# Patient Record
Sex: FEMALE | Race: WHITE | NOT HISPANIC OR LATINO | Employment: OTHER | URBAN - METROPOLITAN AREA
[De-identification: names, ages, dates, MRNs, and addresses within clinical notes are randomized per-mention and may not be internally consistent; named-entity substitution may affect disease eponyms.]

---

## 2023-11-25 ENCOUNTER — HOSPITAL ENCOUNTER (EMERGENCY)
Facility: HOSPITAL | Age: 53
Discharge: HOME | End: 2023-11-25
Attending: STUDENT IN AN ORGANIZED HEALTH CARE EDUCATION/TRAINING PROGRAM
Payer: MEDICAID

## 2023-11-25 VITALS
HEIGHT: 69 IN | HEART RATE: 106 BPM | RESPIRATION RATE: 18 BRPM | SYSTOLIC BLOOD PRESSURE: 212 MMHG | TEMPERATURE: 97.3 F | DIASTOLIC BLOOD PRESSURE: 102 MMHG | BODY MASS INDEX: 43.4 KG/M2 | OXYGEN SATURATION: 98 % | WEIGHT: 293 LBS

## 2023-11-25 DIAGNOSIS — U07.1 COVID: Primary | ICD-10-CM

## 2023-11-25 LAB
ALBUMIN SERPL BCP-MCNC: 4.2 G/DL (ref 3.4–5)
ALP SERPL-CCNC: 88 U/L (ref 33–110)
ALT SERPL W P-5'-P-CCNC: 17 U/L (ref 7–45)
ANION GAP SERPL CALC-SCNC: 12 MMOL/L (ref 10–20)
APPEARANCE UR: CLEAR
AST SERPL W P-5'-P-CCNC: 14 U/L (ref 9–39)
BASOPHILS # BLD AUTO: 0.03 X10*3/UL (ref 0–0.1)
BASOPHILS NFR BLD AUTO: 0.3 %
BILIRUB DIRECT SERPL-MCNC: 0 MG/DL (ref 0–0.3)
BILIRUB SERPL-MCNC: 0.2 MG/DL (ref 0–1.2)
BILIRUB UR STRIP.AUTO-MCNC: NEGATIVE MG/DL
BUN SERPL-MCNC: 14 MG/DL (ref 6–23)
CALCIUM SERPL-MCNC: 8.8 MG/DL (ref 8.6–10.3)
CARDIAC TROPONIN I PNL SERPL HS: 11 NG/L (ref 0–13)
CHLORIDE SERPL-SCNC: 99 MMOL/L (ref 98–107)
CO2 SERPL-SCNC: 25 MMOL/L (ref 21–32)
COLOR UR: ABNORMAL
CREAT SERPL-MCNC: 0.52 MG/DL (ref 0.5–1.05)
EOSINOPHIL # BLD AUTO: 0.02 X10*3/UL (ref 0–0.7)
EOSINOPHIL NFR BLD AUTO: 0.2 %
ERYTHROCYTE [DISTWIDTH] IN BLOOD BY AUTOMATED COUNT: 15 % (ref 11.5–14.5)
FLUAV RNA RESP QL NAA+PROBE: NOT DETECTED
FLUBV RNA RESP QL NAA+PROBE: NOT DETECTED
GFR SERPL CREATININE-BSD FRML MDRD: >90 ML/MIN/1.73M*2
GLUCOSE SERPL-MCNC: 251 MG/DL (ref 74–99)
GLUCOSE UR STRIP.AUTO-MCNC: ABNORMAL MG/DL
HCT VFR BLD AUTO: 35.2 % (ref 36–46)
HGB BLD-MCNC: 10.8 G/DL (ref 12–16)
HOLD SPECIMEN: NORMAL
IMM GRANULOCYTES # BLD AUTO: 0.02 X10*3/UL (ref 0–0.7)
IMM GRANULOCYTES NFR BLD AUTO: 0.2 % (ref 0–0.9)
KETONES UR STRIP.AUTO-MCNC: NEGATIVE MG/DL
LEUKOCYTE ESTERASE UR QL STRIP.AUTO: NEGATIVE
LIPASE SERPL-CCNC: 20 U/L (ref 9–82)
LYMPHOCYTES # BLD AUTO: 1.42 X10*3/UL (ref 1.2–4.8)
LYMPHOCYTES NFR BLD AUTO: 15.1 %
MCH RBC QN AUTO: 24.4 PG (ref 26–34)
MCHC RBC AUTO-ENTMCNC: 30.7 G/DL (ref 32–36)
MCV RBC AUTO: 80 FL (ref 80–100)
MONOCYTES # BLD AUTO: 0.53 X10*3/UL (ref 0.1–1)
MONOCYTES NFR BLD AUTO: 5.6 %
MUCOUS THREADS #/AREA URNS AUTO: NORMAL /LPF
NEUTROPHILS # BLD AUTO: 7.4 X10*3/UL (ref 1.2–7.7)
NEUTROPHILS NFR BLD AUTO: 78.6 %
NITRITE UR QL STRIP.AUTO: NEGATIVE
NRBC BLD-RTO: 0 /100 WBCS (ref 0–0)
PH UR STRIP.AUTO: 6 [PH]
PLATELET # BLD AUTO: 244 X10*3/UL (ref 150–450)
POTASSIUM SERPL-SCNC: 4 MMOL/L (ref 3.5–5.3)
PROT SERPL-MCNC: 7.3 G/DL (ref 6.4–8.2)
PROT UR STRIP.AUTO-MCNC: ABNORMAL MG/DL
RBC # BLD AUTO: 4.42 X10*6/UL (ref 4–5.2)
RBC # UR STRIP.AUTO: NEGATIVE /UL
RBC #/AREA URNS AUTO: NORMAL /HPF
SARS-COV-2 RNA RESP QL NAA+PROBE: DETECTED
SODIUM SERPL-SCNC: 132 MMOL/L (ref 136–145)
SP GR UR STRIP.AUTO: 1.01
SQUAMOUS #/AREA URNS AUTO: NORMAL /HPF
UROBILINOGEN UR STRIP.AUTO-MCNC: <2 MG/DL
WBC # BLD AUTO: 9.4 X10*3/UL (ref 4.4–11.3)
WBC #/AREA URNS AUTO: NORMAL /HPF

## 2023-11-25 PROCEDURE — 84484 ASSAY OF TROPONIN QUANT: CPT | Performed by: STUDENT IN AN ORGANIZED HEALTH CARE EDUCATION/TRAINING PROGRAM

## 2023-11-25 PROCEDURE — 99284 EMERGENCY DEPT VISIT MOD MDM: CPT | Performed by: STUDENT IN AN ORGANIZED HEALTH CARE EDUCATION/TRAINING PROGRAM

## 2023-11-25 PROCEDURE — 82248 BILIRUBIN DIRECT: CPT | Performed by: STUDENT IN AN ORGANIZED HEALTH CARE EDUCATION/TRAINING PROGRAM

## 2023-11-25 PROCEDURE — 81001 URINALYSIS AUTO W/SCOPE: CPT | Performed by: STUDENT IN AN ORGANIZED HEALTH CARE EDUCATION/TRAINING PROGRAM

## 2023-11-25 PROCEDURE — 36415 COLL VENOUS BLD VENIPUNCTURE: CPT | Performed by: STUDENT IN AN ORGANIZED HEALTH CARE EDUCATION/TRAINING PROGRAM

## 2023-11-25 PROCEDURE — 80053 COMPREHEN METABOLIC PANEL: CPT | Performed by: STUDENT IN AN ORGANIZED HEALTH CARE EDUCATION/TRAINING PROGRAM

## 2023-11-25 PROCEDURE — 87636 SARSCOV2 & INF A&B AMP PRB: CPT | Performed by: STUDENT IN AN ORGANIZED HEALTH CARE EDUCATION/TRAINING PROGRAM

## 2023-11-25 PROCEDURE — 83690 ASSAY OF LIPASE: CPT | Performed by: STUDENT IN AN ORGANIZED HEALTH CARE EDUCATION/TRAINING PROGRAM

## 2023-11-25 PROCEDURE — 85025 COMPLETE CBC W/AUTO DIFF WBC: CPT | Performed by: STUDENT IN AN ORGANIZED HEALTH CARE EDUCATION/TRAINING PROGRAM

## 2023-11-25 PROCEDURE — 99283 EMERGENCY DEPT VISIT LOW MDM: CPT | Mod: 25

## 2023-11-25 ASSESSMENT — COLUMBIA-SUICIDE SEVERITY RATING SCALE - C-SSRS
6. HAVE YOU EVER DONE ANYTHING, STARTED TO DO ANYTHING, OR PREPARED TO DO ANYTHING TO END YOUR LIFE?: NO
1. IN THE PAST MONTH, HAVE YOU WISHED YOU WERE DEAD OR WISHED YOU COULD GO TO SLEEP AND NOT WAKE UP?: NO
2. HAVE YOU ACTUALLY HAD ANY THOUGHTS OF KILLING YOURSELF?: NO

## 2023-11-25 ASSESSMENT — PAIN - FUNCTIONAL ASSESSMENT: PAIN_FUNCTIONAL_ASSESSMENT: 0-10

## 2023-11-25 ASSESSMENT — PAIN SCALES - GENERAL: PAINLEVEL_OUTOF10: 0 - NO PAIN

## 2023-11-26 NOTE — DISCHARGE INSTRUCTIONS
You were diagnosed with COVID-19.  We did discuss the medication Paxlovid (information provided for the generic name nirmatrelvir/ritonavir), which you did not want to take.  Please call your primary care physician.  Please return to the emergency department if your symptoms worsen, you note increasing shortness of breath, or if you have any other concerns.  In addition to the information provided and what we talked about in the emergency department, please feel free to visit the CDC website regarding COVID-19 quarantine precautions.

## 2023-11-26 NOTE — ED PROVIDER NOTES
HPI   Chief Complaint   Patient presents with    Hypertension     52 y/o female complains of rapid heart rate and presents with hypertension. Patient denies chest  pain or sob. Complains of shivering and  nausea.       History of hypertension on lisinopril as well as diabetes on glyburide and metformin presents with acute onset of generally feeling unwell.  Patient was reportedly in her usual state of health when she began to feel nauseous, dizzy, and generally unwell.  Unclear if this was exertional or not.  Feels more comfortable resting in the emergency department bed.  No falls or head trauma.  Denies headache, vision changes, chest pain, shortness of breath, abdominal pain, urinary symptoms, diarrhea, and blood in stool.  No known sick contacts.  Has never had anything like this before.  Still able to ambulate without assistance.      History provided by:  Patient and spouse   used: No (Patient's primary language is not English.  She was offered an , but declined.  She prefers her daughter at bedside interpret.  We discussed the risk of this.)                        [place] Coma Scale Score: 15                  Patient History   No past medical history on file.  No past surgical history on file.  No family history on file.  Social History     Tobacco Use    Smoking status: Not on file    Smokeless tobacco: Not on file   Substance Use Topics    Alcohol use: Not on file    Drug use: Not on file       Physical Exam   ED Triage Vitals [11/25/23 1934]   Temp Heart Rate Resp BP   36.3 °C (97.3 °F) 106 18 (!) 212/102      SpO2 Temp Source Heart Rate Source Patient Position   98 % Temporal Monitor Sitting      BP Location FiO2 (%)     Right arm --       Physical Exam  Vitals and nursing note reviewed.   HENT:      Head: Atraumatic.      Mouth/Throat:      Mouth: Mucous membranes are moist.   Eyes:      Extraocular Movements: Extraocular movements intact.      Conjunctiva/sclera:  Conjunctivae normal.      Pupils: Pupils are equal, round, and reactive to light.   Cardiovascular:      Rate and Rhythm: Regular rhythm. Tachycardia present.      Pulses: Normal pulses.      Comments: There is no lower extremity edema or erythema.  Pulmonary:      Effort: Pulmonary effort is normal.      Breath sounds: Normal breath sounds.   Abdominal:      Palpations: Abdomen is soft.      Tenderness: There is no abdominal tenderness.   Genitourinary:     Comments: No suprapubic tenderness.  No CVA tenderness.  Musculoskeletal:         General: No deformity. Normal range of motion.      Cervical back: Normal range of motion.   Skin:     General: Skin is warm and dry.   Neurological:      Mental Status: She is alert.      Comments: Mentating appropriately.  Cranial nerves II through XII grossly intact.  No obvious nystagmus with primary gaze, leftward gaze, or rightward gaze.  No ataxia with finger-nose-finger or heel-to-shin.  No drift with any of the extremities.  No dysarthria.  No diplopia.  No dysphagia.  No aphasia.  No extinction or inattention.  Sensation is intact to light touch in all extremities.         ED Course & MDM   ED Course as of 11/25/23 2130   Sat Nov 25, 2023 2018 ECG 12 lead  My ECG interpretation: Normal sinus rhythm heart rate 108, no ST segment elevation, QTc 463 [AB]   2112 Coronavirus 2019, PCR(!): Detected  Discussed this with the patient and how I think this is likely the cause of her nausea and generally feeling unwell.  As she has no signs of trauma and no neurologic deficits, we discussed the need for CT imaging in light of this positive test; she would not like to proceed with CT imaging, which I think is very reasonable. [AB]   2129 We had a shared decision-making discussion regarding Paxlovid.  Patient would not like to pursue this therapy. [AB]   2129 SpO2: 98 % [AB]      ED Course User Index  [AB] Zack Cottrell MD         Diagnoses as of 11/25/23 2130   COVID        Medical Decision Making  History of hypertension on lisinopril as well as diabetes on glyburide and metformin presents with acute onset of generally feeling unwell with nausea and dizziness.  Dizziness has improved since she has been at rest in the emergency department.    No red flag signs/symptoms of acute vestibular syndrome or posterior CVA on exam.  No cortical findings on neurologic exam.    Found to have COVID-19.  Not hypoxic.  No indication for Decadron.  No indication for admission at this time.  Discussed Paxlovid, but patient declined.    Amount and/or Complexity of Data Reviewed  Labs: ordered.  ECG/medicine tests: ordered and independent interpretation performed. Decision-making details documented in ED Course.        Procedure  Procedures     Zack Cottrell MD  11/25/23 3149

## 2023-12-19 ENCOUNTER — HOSPITAL ENCOUNTER (OUTPATIENT)
Dept: CARDIOLOGY | Facility: HOSPITAL | Age: 53
Discharge: HOME | End: 2023-12-19
Payer: MEDICAID

## 2023-12-19 PROCEDURE — 93005 ELECTROCARDIOGRAM TRACING: CPT

## 2023-12-30 LAB
ATRIAL RATE: 108 BPM
P AXIS: 49 DEGREES
P OFFSET: 196 MS
P ONSET: 138 MS
PR INTERVAL: 150 MS
Q ONSET: 213 MS
QRS COUNT: 17 BEATS
QRS DURATION: 108 MS
QT INTERVAL: 346 MS
QTC CALCULATION(BAZETT): 463 MS
QTC FREDERICIA: 420 MS
R AXIS: 16 DEGREES
T AXIS: 43 DEGREES
T OFFSET: 386 MS
VENTRICULAR RATE: 108 BPM

## 2025-05-04 ENCOUNTER — OFFICE VISIT (OUTPATIENT)
Dept: URGENT CARE | Age: 55
End: 2025-05-04
Payer: MEDICAID

## 2025-05-04 VITALS
OXYGEN SATURATION: 98 % | HEART RATE: 86 BPM | DIASTOLIC BLOOD PRESSURE: 87 MMHG | SYSTOLIC BLOOD PRESSURE: 144 MMHG | TEMPERATURE: 97.5 F | RESPIRATION RATE: 20 BRPM

## 2025-05-04 DIAGNOSIS — H60.311 ACUTE DIFFUSE OTITIS EXTERNA OF RIGHT EAR: ICD-10-CM

## 2025-05-04 DIAGNOSIS — H65.91 OTITIS MEDIA WITH EFFUSION, RIGHT: Primary | ICD-10-CM

## 2025-05-04 DIAGNOSIS — I10 ESSENTIAL HYPERTENSION: ICD-10-CM

## 2025-05-04 PROBLEM — E11.9 CONTROLLED TYPE 2 DIABETES MELLITUS WITHOUT COMPLICATION, WITHOUT LONG-TERM CURRENT USE OF INSULIN: Status: ACTIVE | Noted: 2019-09-11

## 2025-05-04 RX ORDER — NEOMYCIN SULFATE, POLYMYXIN B SULFATE, HYDROCORTISONE 3.5; 10000; 1 MG/ML; [USP'U]/ML; MG/ML
3 SOLUTION/ DROPS AURICULAR (OTIC) 4 TIMES DAILY
Qty: 10 ML | Refills: 0 | Status: SHIPPED | OUTPATIENT
Start: 2025-05-04 | End: 2025-05-14

## 2025-05-04 RX ORDER — FLUTICASONE PROPIONATE 50 MCG
1 SPRAY, SUSPENSION (ML) NASAL DAILY
Qty: 16 G | Refills: 0 | Status: SHIPPED | OUTPATIENT
Start: 2025-05-04 | End: 2026-05-04

## 2025-05-04 RX ORDER — METFORMIN HYDROCHLORIDE 500 MG/1
1000 TABLET, EXTENDED RELEASE ORAL
COMMUNITY

## 2025-05-04 RX ORDER — GLYBURIDE 5 MG/1
TABLET ORAL
COMMUNITY

## 2025-05-04 RX ORDER — AMOXICILLIN AND CLAVULANATE POTASSIUM 875; 125 MG/1; MG/1
875 TABLET, FILM COATED ORAL 2 TIMES DAILY
Qty: 20 TABLET | Refills: 0 | Status: SHIPPED | OUTPATIENT
Start: 2025-05-04 | End: 2025-05-14

## 2025-05-04 RX ORDER — LISINOPRIL 20 MG/1
20 TABLET ORAL DAILY
COMMUNITY

## 2025-05-04 ASSESSMENT — ENCOUNTER SYMPTOMS
DIZZINESS: 0
CHILLS: 0
FEVER: 0
SORE THROAT: 0
RHINORRHEA: 0
COUGH: 0
DIARRHEA: 0
VOMITING: 0
HEADACHES: 0
NECK PAIN: 0
ABDOMINAL PAIN: 0
FATIGUE: 0

## 2025-05-04 NOTE — PATIENT INSTRUCTIONS
Your child has an ear infection (otitis media). Here’s how to care for them at home:    Give pain relievers like acetaminophen (Tylenol) or ibuprofen (Motrin) as needed for pain or fever.  Encourage fluids to keep them hydrated.  Use warm compresses on the ear for comfort.  Give antibiotics as prescribed (if provided) and complete the full course.  Avoid water in the ear if there is drainage.  Watch for:  Worsening pain or fever over 102°F.  Swelling, redness, or drainage from the ear.  Trouble hearing or persistent symptoms after treatment.  Follow up with the pediatrician if symptoms do not improve in a few days or worsen.

## 2025-05-04 NOTE — PROGRESS NOTES
Subjective   Patient ID: Nuzhat Maki is a 54 y.o. female. They present today with a chief complaint of Earache (X 1 day).    History of Present Illness  54 year old female presents with complaint of right ear pain. Symptoms started after she got her hear washed at the salon 2 days ago and have progressively worsened. Has taken tylenol with mild relief. Reports hx of ear infections and has had to see ENT in the past.       Earache   There is pain in the right ear. This is a new problem. The current episode started in the past 7 days. The problem occurs constantly. The problem has been gradually worsening. There has been no fever. The pain is at a severity of 7/10. The pain is moderate. Pertinent negatives include no abdominal pain, coughing, diarrhea, ear discharge, headaches, hearing loss, neck pain, rash, rhinorrhea, sore throat or vomiting. She has tried acetaminophen for the symptoms. The treatment provided no relief. Her past medical history is significant for a chronic ear infection. There is no history of hearing loss or a tympanostomy tube.       Past Medical History  Allergies as of 05/04/2025    (No Known Allergies)       Prescriptions Prior to Admission[1]     Medical History[2]    Surgical History[3]     reports that she has never smoked. She has never used smokeless tobacco.    Review of Systems  Review of Systems   Constitutional:  Negative for chills, fatigue and fever.   HENT:  Positive for ear pain. Negative for ear discharge, hearing loss, rhinorrhea and sore throat.    Respiratory:  Negative for cough.    Cardiovascular:  Negative for chest pain.   Gastrointestinal:  Negative for abdominal pain, diarrhea and vomiting.   Musculoskeletal:  Negative for neck pain.   Skin:  Negative for rash.   Neurological:  Negative for dizziness and headaches.   All other systems reviewed and are negative.           Objective    Vitals:    05/04/25 1227   BP: 144/87   Pulse: 86   Resp: 20   Temp: 36.4 °C  (97.5 °F)   TempSrc: Temporal   SpO2: 98%     No LMP recorded (lmp unknown). Patient is postmenopausal.    Physical Exam  Vitals and nursing note reviewed.   Constitutional:       Appearance: Normal appearance. She is normal weight.   HENT:      Head: Normocephalic.      Right Ear: Swelling and tenderness present. A middle ear effusion is present. Tympanic membrane is injected and bulging.      Left Ear: Tympanic membrane, ear canal and external ear normal.      Nose: Nose normal.      Mouth/Throat:      Mouth: Mucous membranes are moist.      Pharynx: Oropharynx is clear.   Eyes:      Pupils: Pupils are equal, round, and reactive to light.   Cardiovascular:      Rate and Rhythm: Normal rate and regular rhythm.      Pulses: Normal pulses.      Heart sounds: Normal heart sounds.   Pulmonary:      Effort: Pulmonary effort is normal.      Breath sounds: Normal breath sounds.   Musculoskeletal:         General: Normal range of motion.      Cervical back: Normal range of motion and neck supple.   Skin:     General: Skin is warm.   Neurological:      Mental Status: She is alert and oriented to person, place, and time.   Psychiatric:         Mood and Affect: Mood normal.         Behavior: Behavior normal.         Procedures    Point of Care Test & Imaging Results from this visit  No results found for this visit on 05/04/25.   Imaging  No results found.    Cardiology, Vascular, and Other Imaging  No other imaging results found for the past 2 days      Diagnostic study results (if any) were reviewed by PEYMAN Olea.    Assessment/Plan   Allergies, medications, history, and pertinent labs/EKGs/Imaging reviewed by PEYMAN Olea.     Medical Decision Making  54-year-old patient presents with symptoms consistent with otitis media with effusion and concurrent otitis externa. On exam, external ear canal was erythematous and edematous with visible fluid behind the tympanic membrane. No signs of  mastoiditis or systemic involvement were noted. Given the combined presentation, treatment includes a combination of oral antibiotics to address middle ear involvement and antibiotic ear drops for the external component. Patient was advised on ear hygiene and to avoid water exposure to the affected ear. Return precautions discussed. Follow-up with primary care or ENT as needed for unresolved symptoms or worsening condition.    Orders and Diagnoses  Diagnoses and all orders for this visit:  Otitis media with effusion, right  -     fluticasone (Flonase) 50 mcg/actuation nasal spray; Administer 1 spray into each nostril once daily. Shake gently. Before first use, prime pump. After use, clean tip and replace cap.  -     amoxicillin-clavulanate (Augmentin) 875-125 mg tablet; Take 1 tablet (875 mg) by mouth 2 times a day for 10 days.  Acute diffuse otitis externa of right ear  -     neomycin-polymyxin-HC (Cortisporin) otic solution; Administer 3 drops into the right ear 4 times a day for 10 days.  Essential hypertension      Medical Admin Record      Patient disposition: Home    Electronically signed by PEYMAN Olea  1:13 PM           [1] (Not in a hospital admission)   [2] History reviewed. No pertinent past medical history.  [3] History reviewed. No pertinent surgical history.